# Patient Record
Sex: FEMALE | Race: WHITE | URBAN - METROPOLITAN AREA
[De-identification: names, ages, dates, MRNs, and addresses within clinical notes are randomized per-mention and may not be internally consistent; named-entity substitution may affect disease eponyms.]

---

## 2018-11-05 ENCOUNTER — FOLLOW UP ESTABLISHED (OUTPATIENT)
Dept: URBAN - METROPOLITAN AREA CLINIC 24 | Facility: CLINIC | Age: 83
End: 2018-11-05
Payer: COMMERCIAL

## 2018-11-05 DIAGNOSIS — H43.393 OTHER VITREOUS OPACITIES, BILATERAL: ICD-10-CM

## 2018-11-05 DIAGNOSIS — H52.4 PRESBYOPIA: ICD-10-CM

## 2018-11-05 PROCEDURE — 92250 FUNDUS PHOTOGRAPHY W/I&R: CPT | Performed by: OPTOMETRIST

## 2018-11-05 PROCEDURE — 92015 DETERMINE REFRACTIVE STATE: CPT | Performed by: OPTOMETRIST

## 2018-11-05 PROCEDURE — 92014 COMPRE OPH EXAM EST PT 1/>: CPT | Performed by: OPTOMETRIST

## 2018-11-05 ASSESSMENT — INTRAOCULAR PRESSURE
OS: 17
OD: 16

## 2018-11-05 ASSESSMENT — KERATOMETRY
OD: 45.85
OS: 46.23

## 2018-11-05 ASSESSMENT — VISUAL ACUITY
OS: 20/20
OD: 20/20

## 2022-01-03 ENCOUNTER — OFFICE VISIT (OUTPATIENT)
Dept: URBAN - METROPOLITAN AREA CLINIC 24 | Facility: CLINIC | Age: 87
End: 2022-01-03
Payer: COMMERCIAL

## 2022-01-03 DIAGNOSIS — D31.31 BENIGN NEOPLASM OF RIGHT CHOROID: Primary | ICD-10-CM

## 2022-01-03 DIAGNOSIS — H16.223 KERATOCONJUNCTIVITIS SICCA, BILATERAL: ICD-10-CM

## 2022-01-03 DIAGNOSIS — Z96.1 PRESENCE OF INTRAOCULAR LENS: ICD-10-CM

## 2022-01-03 PROCEDURE — 92250 FUNDUS PHOTOGRAPHY W/I&R: CPT | Performed by: OPTOMETRIST

## 2022-01-03 PROCEDURE — 92004 COMPRE OPH EXAM NEW PT 1/>: CPT | Performed by: OPTOMETRIST

## 2022-01-03 ASSESSMENT — VISUAL ACUITY
OD: 20/20
OS: 20/20

## 2022-01-03 ASSESSMENT — INTRAOCULAR PRESSURE
OD: 14
OS: 15

## 2022-01-03 NOTE — IMPRESSION/PLAN
Impression: Benign neoplasm of right choroid: D31.31. 
-- superior OD; known dx since at least 2014 (-) Thickness (-) Fluid (-) Pt symptomatic (-) Lipofuscein (-) Margin Irregularity
(+) Drusen Absent
(+) Halo Absence Plan: Very low risk. Pt advised. Monitor yearly. Updated photodocumentation today.

## 2022-01-03 NOTE — IMPRESSION/PLAN
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  Plan: Continue consistent use of afts; add lub shashi / gel qhs ou

## 2024-11-25 ENCOUNTER — OFFICE VISIT (OUTPATIENT)
Dept: URBAN - METROPOLITAN AREA CLINIC 24 | Facility: CLINIC | Age: 89
End: 2024-11-25
Payer: COMMERCIAL

## 2024-11-25 DIAGNOSIS — Z96.1 PRESENCE OF INTRAOCULAR LENS: ICD-10-CM

## 2024-11-25 DIAGNOSIS — H16.223 KERATOCONJUNCTIVITIS SICCA, BILATERAL: ICD-10-CM

## 2024-11-25 DIAGNOSIS — H43.813 VITREOUS DEGENERATION, BILATERAL: ICD-10-CM

## 2024-11-25 DIAGNOSIS — D31.31 BENIGN NEOPLASM OF RIGHT CHOROID: Primary | ICD-10-CM

## 2024-11-25 PROCEDURE — 92134 CPTRZ OPH DX IMG PST SGM RTA: CPT | Performed by: OPTOMETRIST

## 2024-11-25 PROCEDURE — 92014 COMPRE OPH EXAM EST PT 1/>: CPT | Performed by: OPTOMETRIST

## 2024-11-25 RX ORDER — PROPYLENE GLYCOL 0.06 MG/ML
0.6 % SOLUTION/ DROPS OPHTHALMIC
Qty: 0 | Refills: 0 | Status: ACTIVE
Start: 2024-11-25

## 2024-11-25 ASSESSMENT — INTRAOCULAR PRESSURE
OS: 15
OD: 16

## 2024-11-25 ASSESSMENT — VISUAL ACUITY
OD: 20/30
OS: 20/25